# Patient Record
(demographics unavailable — no encounter records)

---

## 2024-11-09 NOTE — HISTORY OF PRESENT ILLNESS
[de-identified] : This is a 54-year-old gentleman with chronic low back pain.  Pain is every day lying down can make it better movement makes it worse He has had treatment by Dr. Ewing in the past.  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

## 2024-11-09 NOTE — HISTORY OF PRESENT ILLNESS
[de-identified] : This is a 54-year-old gentleman with chronic low back pain.  Pain is every day lying down can make it better movement makes it worse He has had treatment by Dr. Ewing in the past.  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

## 2024-11-09 NOTE — ASSESSMENT
[FreeTextEntry1] : Started on Mobic recommended MRI initially of the lumbar spine is soon to see PM and neuro At this time based on the patient's lengthy history current symptoms physical findings and diagnostics it is my opinion that he is totally disabled unable to work I will see him back in a few months if any other diagnostics are performed I asked that he provide copies for review

## 2024-11-09 NOTE — IMAGING
[de-identified] :  pleasant gentleman easy to examine  cervical spine: mild limits in rotation, flexion and extension no pain with compression mild spasm upper extremities no focal deficits neurologically  shoulders good motion no impingement  Lumbar spine: stiff, limits in flexion and extension pain with flexion extension tightness in dorsolumbar fascia and hamstrings negative straight leg bilaterally hips good motion Reflexes brisk and symmetric Lumbar x-rays: mild multilevel degenerative disc disease no compression fractures Both knees good motion patellofemoral crepitus no laxity or instability minimal effusion no Baker's cyst Mild tenderness on medial border both proximal tibias No atrophy no edema full sensation X-rays both knees minimal degenerative change normal gait

## 2024-11-09 NOTE — IMAGING
[de-identified] :  pleasant gentleman easy to examine  cervical spine: mild limits in rotation, flexion and extension no pain with compression mild spasm upper extremities no focal deficits neurologically  shoulders good motion no impingement  Lumbar spine: stiff, limits in flexion and extension pain with flexion extension tightness in dorsolumbar fascia and hamstrings negative straight leg bilaterally hips good motion Reflexes brisk and symmetric Lumbar x-rays: mild multilevel degenerative disc disease no compression fractures Both knees good motion patellofemoral crepitus no laxity or instability minimal effusion no Baker's cyst Mild tenderness on medial border both proximal tibias No atrophy no edema full sensation X-rays both knees minimal degenerative change normal gait

## 2025-02-06 NOTE — ASSESSMENT
[FreeTextEntry1] : Started on Mobic reviewed MRI lumbar spine Follow-up with pain management At this time based on the patient's lengthy history current symptoms physical findings and diagnostics it is my opinion that he is totally disabled unable to work I will see him back in a few months if any other diagnostics are performed I asked that he provide copies for review We did discuss the benefit of some significant weight reduction

## 2025-02-06 NOTE — IMAGING
[de-identified] :  pleasant gentleman easy to examine  cervical spine: mild limits in rotation, flexion and extension no pain with compression mild spasm upper extremities no focal deficits neurologically  shoulders good motion no impingement  Lumbar spine: stiff, limits in flexion and extension pain with flexion extension tightness in dorsolumbar fascia and hamstrings negative straight leg bilaterally hips good motion Reflexes brisk and symmetric Lumbar x-rays: mild multilevel degenerative disc disease no compression fractures Both knees good motion patellofemoral crepitus no laxity or instability minimal effusion no Baker's cyst Mild tenderness on medial border both proximal tibias No atrophy no edema full sensation X-rays both knees minimal degenerative change normal gait

## 2025-02-06 NOTE — REASON FOR VISIT
[FreeTextEntry2] :  lumbar spine feels about the same still using Mobic did get lumbar MRI noting herniated disc L5-S1 to the right try to do some stretching complains of left hamstring tightness the last few days did have a lumbar epidural from pain management PMD Dr. Rangel weight still an issue

## 2025-02-06 NOTE — HISTORY OF PRESENT ILLNESS
[de-identified] : This is a 54-year-old gentleman with chronic low back pain.  Pain is every day lying down can make it better movement makes it worse He has had treatment by Dr. Ewing in the past.  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

## 2025-02-06 NOTE — HISTORY OF PRESENT ILLNESS
[de-identified] : This is a 54-year-old gentleman with chronic low back pain.  Pain is every day lying down can make it better movement makes it worse He has had treatment by Dr. Ewing in the past.  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

## 2025-02-06 NOTE — IMAGING
[de-identified] :  pleasant gentleman easy to examine  cervical spine: mild limits in rotation, flexion and extension no pain with compression mild spasm upper extremities no focal deficits neurologically  shoulders good motion no impingement  Lumbar spine: stiff, limits in flexion and extension pain with flexion extension tightness in dorsolumbar fascia and hamstrings negative straight leg bilaterally hips good motion Reflexes brisk and symmetric Lumbar x-rays: mild multilevel degenerative disc disease no compression fractures Both knees good motion patellofemoral crepitus no laxity or instability minimal effusion no Baker's cyst Mild tenderness on medial border both proximal tibias No atrophy no edema full sensation X-rays both knees minimal degenerative change normal gait

## 2025-03-04 NOTE — ASSESSMENT
[FreeTextEntry1] : Patient was placed into a tall cam walker boot, weightbearing as tolerated.  We discussed rest, icing, anti-inflammatory.  He already takes meloxicam.  Follow-up in several weeks for repeat evaluation.

## 2025-03-04 NOTE — IMAGING
[de-identified] : On examination of the right calf no significant swelling, no ecchymosis, no erythema.  Skin is intact.  Tenderness is noted over the calf muscle belly, gastrocnemius. No tenderness over the musculotendinous junction.  No tenderness over the Achilles tendon.  No palpable defect.  With patient lying prone, he has a negative Starks's test.  He is able to plantarflex and dorsiflex with minimal pain.  He does report some pain with ambulating.  Full range of motion of the knee.  No tenderness of the hamstring.  X-ray right tib-fib reviewed from [facility] no fracture or dislocation. Ultrasound lower extremity reviewed from formerly [facility] noting achiiles intact, probable "muscle tear"

## 2025-03-04 NOTE — HISTORY OF PRESENT ILLNESS
[de-identified] : 54-year-old male comes in today for an evaluation of his right calf pain and injury that occurred on March 1st.  Patient states he was walking upstairs and felt a pop in the calf.  He does some exercise on the treadmill which he did do that morning.  Patient is under the care of Dr. Sanford for his lumbar spine.  Takes meloxicam. Patient does smoke cigarettes.

## 2025-07-08 NOTE — HISTORY OF PRESENT ILLNESS
[de-identified] : This is a 54-year-old gentleman with chronic low back pain.  Pain is every day lying down can make it better movement makes it worse He has had treatment by Dr. Ewing in the past.  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

## 2025-07-08 NOTE — HISTORY OF PRESENT ILLNESS
[de-identified] : This is a 54-year-old gentleman with chronic low back pain.  Pain is every day lying down can make it better movement makes it worse He has had treatment by Dr. Ewing in the past.  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

## 2025-07-08 NOTE — REASON FOR VISIT
[FreeTextEntry2] : Patient is here today for right calf pain  Pain is improving therapy ended weight still elevated to 40 morbid soreness still has Mobic trying to do stretching exercises Still seeing pain management Dr. Martel no recent injections

## 2025-07-08 NOTE — IMAGING
[de-identified] :  pleasant gentleman easy to examine  cervical spine: mild limits in rotation, flexion and extension no pain with compression mild spasm upper extremities no focal deficits neurologically  shoulders good motion no impingement  Lumbar spine: stiff, limits in flexion and extension pain with flexion extension tightness in dorsolumbar fascia and hamstrings negative straight leg bilaterally hips good motion Reflexes brisk and symmetric Lumbar x-rays: mild multilevel degenerative disc disease no compression fractures Both knees good motion patellofemoral crepitus no laxity or instability minimal effusion no Baker's cyst Mild tenderness on medial border both proximal tibias No atrophy no edema full sensation X-rays both knees minimal degenerative change normal gait Right calf mild tenderness approximately no ecchymosis Achilles intact

## 2025-07-08 NOTE — IMAGING
[de-identified] :  pleasant gentleman easy to examine  cervical spine: mild limits in rotation, flexion and extension no pain with compression mild spasm upper extremities no focal deficits neurologically  shoulders good motion no impingement  Lumbar spine: stiff, limits in flexion and extension pain with flexion extension tightness in dorsolumbar fascia and hamstrings negative straight leg bilaterally hips good motion Reflexes brisk and symmetric Lumbar x-rays: mild multilevel degenerative disc disease no compression fractures Both knees good motion patellofemoral crepitus no laxity or instability minimal effusion no Baker's cyst Mild tenderness on medial border both proximal tibias No atrophy no edema full sensation X-rays both knees minimal degenerative change normal gait Right calf mild tenderness approximately no ecchymosis Achilles intact

## 2025-07-08 NOTE — ASSESSMENT
[FreeTextEntry1] : continue Mobic reviewed MRI lumbar spine Follow-up with pain management At this time based on the patient's lengthy history current symptoms physical findings and diagnostics it is my opinion that he is totally disabled unable to work I will see him back in 3 months if any other diagnostics are performed I asked that he provide copies for review We did discuss the benefit of some significant weight reduction Reassured that the calf strain will resolve